# Patient Record
Sex: MALE | Race: BLACK OR AFRICAN AMERICAN | HISPANIC OR LATINO | URBAN - METROPOLITAN AREA
[De-identification: names, ages, dates, MRNs, and addresses within clinical notes are randomized per-mention and may not be internally consistent; named-entity substitution may affect disease eponyms.]

---

## 2019-07-26 ENCOUNTER — INPATIENT (INPATIENT)
Facility: HOSPITAL | Age: 19
LOS: 1 days | Discharge: HOME | DRG: 922 | End: 2019-07-28
Attending: SURGERY | Admitting: SURGERY
Payer: COMMERCIAL

## 2019-07-26 DIAGNOSIS — T75.1XXA UNSPECIFIED EFFECTS OF DROWNING AND NONFATAL SUBMERSION, INITIAL ENCOUNTER: ICD-10-CM

## 2019-07-26 LAB
ABO RH CONFIRMATION: SIGNIFICANT CHANGE UP
ALBUMIN SERPL ELPH-MCNC: 4.6 G/DL — SIGNIFICANT CHANGE UP (ref 3.3–5.2)
ALP SERPL-CCNC: 76 U/L — SIGNIFICANT CHANGE UP (ref 60–270)
ALT FLD-CCNC: 43 U/L — HIGH
AMPHET UR-MCNC: NEGATIVE — SIGNIFICANT CHANGE UP
ANION GAP SERPL CALC-SCNC: 12 MMOL/L — SIGNIFICANT CHANGE UP (ref 5–17)
ANION GAP SERPL CALC-SCNC: 21 MMOL/L — HIGH (ref 5–17)
APPEARANCE UR: CLEAR — SIGNIFICANT CHANGE UP
APTT BLD: 29.4 SEC — SIGNIFICANT CHANGE UP (ref 27.5–36.3)
AST SERPL-CCNC: 35 U/L — SIGNIFICANT CHANGE UP
BACTERIA # UR AUTO: ABNORMAL
BARBITURATES UR SCN-MCNC: NEGATIVE — SIGNIFICANT CHANGE UP
BASOPHILS # BLD AUTO: 0 K/UL — SIGNIFICANT CHANGE UP (ref 0–0.2)
BASOPHILS NFR BLD AUTO: 0 % — SIGNIFICANT CHANGE UP (ref 0–2)
BENZODIAZ UR-MCNC: NEGATIVE — SIGNIFICANT CHANGE UP
BILIRUB SERPL-MCNC: 0.3 MG/DL — LOW (ref 0.4–2)
BILIRUB UR-MCNC: NEGATIVE — SIGNIFICANT CHANGE UP
BLD GP AB SCN SERPL QL: SIGNIFICANT CHANGE UP
BLOOD GAS COMMENTS ARTERIAL: SIGNIFICANT CHANGE UP
BUN SERPL-MCNC: 10 MG/DL — SIGNIFICANT CHANGE UP (ref 8–20)
BUN SERPL-MCNC: 11 MG/DL — SIGNIFICANT CHANGE UP (ref 8–20)
CALCIUM SERPL-MCNC: 9 MG/DL — SIGNIFICANT CHANGE UP (ref 8.6–10.2)
CALCIUM SERPL-MCNC: 9.2 MG/DL — SIGNIFICANT CHANGE UP (ref 8.6–10.2)
CHLORIDE SERPL-SCNC: 104 MMOL/L — SIGNIFICANT CHANGE UP (ref 98–107)
CHLORIDE SERPL-SCNC: 105 MMOL/L — SIGNIFICANT CHANGE UP (ref 98–107)
CK MB CFR SERPL CALC: 1.5 NG/ML — SIGNIFICANT CHANGE UP (ref 0–6.7)
CK SERPL-CCNC: 218 U/L — HIGH (ref 30–200)
CO2 SERPL-SCNC: 18 MMOL/L — LOW (ref 22–29)
CO2 SERPL-SCNC: 24 MMOL/L — SIGNIFICANT CHANGE UP (ref 22–29)
COCAINE METAB.OTHER UR-MCNC: NEGATIVE — SIGNIFICANT CHANGE UP
COLOR SPEC: YELLOW — SIGNIFICANT CHANGE UP
CREAT SERPL-MCNC: 0.97 MG/DL — SIGNIFICANT CHANGE UP (ref 0.5–1.3)
CREAT SERPL-MCNC: 1.42 MG/DL — HIGH (ref 0.5–1.3)
DIFF PNL FLD: NEGATIVE — SIGNIFICANT CHANGE UP
EOSINOPHIL # BLD AUTO: 0.21 K/UL — SIGNIFICANT CHANGE UP (ref 0–0.5)
EOSINOPHIL NFR BLD AUTO: 1.8 % — SIGNIFICANT CHANGE UP (ref 0–6)
EPI CELLS # UR: SIGNIFICANT CHANGE UP
ETHANOL SERPL-MCNC: <10 MG/DL — SIGNIFICANT CHANGE UP
GAS PNL BLDA: SIGNIFICANT CHANGE UP
GIANT PLATELETS BLD QL SMEAR: PRESENT — SIGNIFICANT CHANGE UP
GLUCOSE SERPL-MCNC: 122 MG/DL — HIGH (ref 70–115)
GLUCOSE SERPL-MCNC: 142 MG/DL — HIGH (ref 70–115)
GLUCOSE UR QL: NEGATIVE MG/DL — SIGNIFICANT CHANGE UP
HCO3 BLDA-SCNC: 24 MMOL/L — SIGNIFICANT CHANGE UP (ref 20–26)
HCT VFR BLD CALC: 40.4 % — SIGNIFICANT CHANGE UP (ref 39–50)
HGB BLD-MCNC: 12.6 G/DL — LOW (ref 13–17)
HOROWITZ INDEX BLDA+IHG-RTO: SIGNIFICANT CHANGE UP
INR BLD: 1.06 RATIO — SIGNIFICANT CHANGE UP (ref 0.88–1.16)
KETONES UR-MCNC: NEGATIVE — SIGNIFICANT CHANGE UP
LEUKOCYTE ESTERASE UR-ACNC: NEGATIVE — SIGNIFICANT CHANGE UP
LIDOCAIN IGE QN: 21 U/L — LOW (ref 22–51)
LYMPHOCYTES # BLD AUTO: 5.88 K/UL — HIGH (ref 1–3.3)
LYMPHOCYTES # BLD AUTO: 50.4 % — HIGH (ref 13–44)
MAGNESIUM SERPL-MCNC: 2.1 MG/DL — SIGNIFICANT CHANGE UP (ref 1.6–2.6)
MANUAL SMEAR VERIFICATION: SIGNIFICANT CHANGE UP
MCHC RBC-ENTMCNC: 27 PG — SIGNIFICANT CHANGE UP (ref 27–34)
MCHC RBC-ENTMCNC: 31.2 GM/DL — LOW (ref 32–36)
MCV RBC AUTO: 86.5 FL — SIGNIFICANT CHANGE UP (ref 80–100)
METHADONE UR-MCNC: NEGATIVE — SIGNIFICANT CHANGE UP
MONOCYTES # BLD AUTO: 0.82 K/UL — SIGNIFICANT CHANGE UP (ref 0–0.9)
MONOCYTES NFR BLD AUTO: 7 % — SIGNIFICANT CHANGE UP (ref 2–14)
NEUTROPHILS # BLD AUTO: 4.56 K/UL — SIGNIFICANT CHANGE UP (ref 1.8–7.4)
NEUTROPHILS NFR BLD AUTO: 39.1 % — LOW (ref 43–77)
NITRITE UR-MCNC: NEGATIVE — SIGNIFICANT CHANGE UP
OPIATES UR-MCNC: NEGATIVE — SIGNIFICANT CHANGE UP
PCO2 BLDA: 40 MMHG — SIGNIFICANT CHANGE UP (ref 35–45)
PCP SPEC-MCNC: SIGNIFICANT CHANGE UP
PCP UR-MCNC: NEGATIVE — SIGNIFICANT CHANGE UP
PH BLDA: 7.4 — SIGNIFICANT CHANGE UP (ref 7.35–7.45)
PH UR: 6 — SIGNIFICANT CHANGE UP (ref 5–8)
PHOSPHATE SERPL-MCNC: 3.6 MG/DL — SIGNIFICANT CHANGE UP (ref 2.4–4.7)
PLAT MORPH BLD: NORMAL — SIGNIFICANT CHANGE UP
PLATELET # BLD AUTO: 248 K/UL — SIGNIFICANT CHANGE UP (ref 150–400)
PO2 BLDA: 69 MMHG — LOW (ref 83–108)
POTASSIUM SERPL-MCNC: 3.4 MMOL/L — LOW (ref 3.5–5.3)
POTASSIUM SERPL-MCNC: 4.2 MMOL/L — SIGNIFICANT CHANGE UP (ref 3.5–5.3)
POTASSIUM SERPL-SCNC: 3.4 MMOL/L — LOW (ref 3.5–5.3)
POTASSIUM SERPL-SCNC: 4.2 MMOL/L — SIGNIFICANT CHANGE UP (ref 3.5–5.3)
PROT SERPL-MCNC: 7.5 G/DL — SIGNIFICANT CHANGE UP (ref 6.6–8.7)
PROT UR-MCNC: 15 MG/DL
PROTHROM AB SERPL-ACNC: 12.2 SEC — SIGNIFICANT CHANGE UP (ref 10–12.9)
RBC # BLD: 4.67 M/UL — SIGNIFICANT CHANGE UP (ref 4.2–5.8)
RBC # FLD: 12 % — SIGNIFICANT CHANGE UP (ref 10.3–14.5)
RBC BLD AUTO: SIGNIFICANT CHANGE UP
RBC CASTS # UR COMP ASSIST: SIGNIFICANT CHANGE UP /HPF (ref 0–4)
SAO2 % BLDA: 95 % — SIGNIFICANT CHANGE UP (ref 95–99)
SODIUM SERPL-SCNC: 141 MMOL/L — SIGNIFICANT CHANGE UP (ref 135–145)
SODIUM SERPL-SCNC: 143 MMOL/L — SIGNIFICANT CHANGE UP (ref 135–145)
SP GR SPEC: 1.02 — SIGNIFICANT CHANGE UP (ref 1.01–1.02)
THC UR QL: NEGATIVE — SIGNIFICANT CHANGE UP
TROPONIN T SERPL-MCNC: <0.01 NG/ML — SIGNIFICANT CHANGE UP (ref 0–0.06)
UROBILINOGEN FLD QL: NEGATIVE MG/DL — SIGNIFICANT CHANGE UP
VARIANT LYMPHS # BLD: 1.7 % — SIGNIFICANT CHANGE UP (ref 0–6)
WBC # BLD: 11.67 K/UL — HIGH (ref 3.8–10.5)
WBC # FLD AUTO: 11.67 K/UL — HIGH (ref 3.8–10.5)
WBC UR QL: SIGNIFICANT CHANGE UP

## 2019-07-26 PROCEDURE — 99223 1ST HOSP IP/OBS HIGH 75: CPT

## 2019-07-26 PROCEDURE — 99291 CRITICAL CARE FIRST HOUR: CPT

## 2019-07-26 PROCEDURE — 93010 ELECTROCARDIOGRAM REPORT: CPT

## 2019-07-26 PROCEDURE — 71045 X-RAY EXAM CHEST 1 VIEW: CPT | Mod: 26

## 2019-07-26 PROCEDURE — 71260 CT THORAX DX C+: CPT | Mod: 26

## 2019-07-26 PROCEDURE — 72125 CT NECK SPINE W/O DYE: CPT | Mod: 26

## 2019-07-26 PROCEDURE — 70450 CT HEAD/BRAIN W/O DYE: CPT | Mod: 26

## 2019-07-26 RX ORDER — CEFTRIAXONE 500 MG/1
2000 INJECTION, POWDER, FOR SOLUTION INTRAMUSCULAR; INTRAVENOUS ONCE
Refills: 0 | Status: COMPLETED | OUTPATIENT
Start: 2019-07-26 | End: 2019-07-26

## 2019-07-26 RX ORDER — DOCUSATE SODIUM 100 MG
100 CAPSULE ORAL EVERY 8 HOURS
Refills: 0 | Status: DISCONTINUED | OUTPATIENT
Start: 2019-07-26 | End: 2019-07-26

## 2019-07-26 RX ORDER — ENOXAPARIN SODIUM 100 MG/ML
40 INJECTION SUBCUTANEOUS DAILY
Refills: 0 | Status: DISCONTINUED | OUTPATIENT
Start: 2019-07-26 | End: 2019-07-28

## 2019-07-26 RX ORDER — ACETAMINOPHEN 500 MG
650 TABLET ORAL EVERY 6 HOURS
Refills: 0 | Status: DISCONTINUED | OUTPATIENT
Start: 2019-07-26 | End: 2019-07-28

## 2019-07-26 RX ORDER — LIDOCAINE 4 G/100G
1 CREAM TOPICAL DAILY
Refills: 0 | Status: DISCONTINUED | OUTPATIENT
Start: 2019-07-26 | End: 2019-07-28

## 2019-07-26 RX ORDER — SODIUM CHLORIDE 9 MG/ML
1000 INJECTION, SOLUTION INTRAVENOUS
Refills: 0 | Status: DISCONTINUED | OUTPATIENT
Start: 2019-07-26 | End: 2019-07-26

## 2019-07-26 RX ORDER — ONDANSETRON 8 MG/1
4 TABLET, FILM COATED ORAL EVERY 6 HOURS
Refills: 0 | Status: DISCONTINUED | OUTPATIENT
Start: 2019-07-26 | End: 2019-07-27

## 2019-07-26 RX ORDER — CHLORHEXIDINE GLUCONATE 213 G/1000ML
1 SOLUTION TOPICAL
Refills: 0 | Status: DISCONTINUED | OUTPATIENT
Start: 2019-07-26 | End: 2019-07-27

## 2019-07-26 RX ORDER — ACETAMINOPHEN 500 MG
1000 TABLET ORAL ONCE
Refills: 0 | Status: COMPLETED | OUTPATIENT
Start: 2019-07-26 | End: 2019-07-26

## 2019-07-26 RX ORDER — IPRATROPIUM/ALBUTEROL SULFATE 18-103MCG
3 AEROSOL WITH ADAPTER (GRAM) INHALATION EVERY 6 HOURS
Refills: 0 | Status: COMPLETED | OUTPATIENT
Start: 2019-07-26 | End: 2019-07-27

## 2019-07-26 RX ORDER — ONDANSETRON 8 MG/1
4 TABLET, FILM COATED ORAL ONCE
Refills: 0 | Status: COMPLETED | OUTPATIENT
Start: 2019-07-26 | End: 2019-07-26

## 2019-07-26 RX ORDER — SENNA PLUS 8.6 MG/1
2 TABLET ORAL AT BEDTIME
Refills: 0 | Status: DISCONTINUED | OUTPATIENT
Start: 2019-07-26 | End: 2019-07-26

## 2019-07-26 RX ORDER — ONDANSETRON 8 MG/1
4 TABLET, FILM COATED ORAL EVERY 6 HOURS
Refills: 0 | Status: DISCONTINUED | OUTPATIENT
Start: 2019-07-26 | End: 2019-07-26

## 2019-07-26 RX ADMIN — LIDOCAINE 1 PATCH: 4 CREAM TOPICAL at 11:11

## 2019-07-26 RX ADMIN — LIDOCAINE 1 PATCH: 4 CREAM TOPICAL at 23:00

## 2019-07-26 RX ADMIN — ENOXAPARIN SODIUM 40 MILLIGRAM(S): 100 INJECTION SUBCUTANEOUS at 11:11

## 2019-07-26 RX ADMIN — Medication 100 MILLIGRAM(S): at 21:27

## 2019-07-26 RX ADMIN — Medication 100 MILLIGRAM(S): at 13:19

## 2019-07-26 RX ADMIN — Medication 3 MILLILITER(S): at 08:48

## 2019-07-26 RX ADMIN — Medication 400 MILLIGRAM(S): at 03:30

## 2019-07-26 RX ADMIN — CHLORHEXIDINE GLUCONATE 1 APPLICATION(S): 213 SOLUTION TOPICAL at 05:06

## 2019-07-26 RX ADMIN — ONDANSETRON 4 MILLIGRAM(S): 8 TABLET, FILM COATED ORAL at 05:06

## 2019-07-26 RX ADMIN — SODIUM CHLORIDE 125 MILLILITER(S): 9 INJECTION, SOLUTION INTRAVENOUS at 03:44

## 2019-07-26 RX ADMIN — Medication 650 MILLIGRAM(S): at 15:00

## 2019-07-26 RX ADMIN — LIDOCAINE 1 PATCH: 4 CREAM TOPICAL at 19:12

## 2019-07-26 RX ADMIN — Medication 1000 MILLIGRAM(S): at 03:45

## 2019-07-26 RX ADMIN — Medication 3 MILLILITER(S): at 20:00

## 2019-07-26 RX ADMIN — CEFTRIAXONE 50 MILLIGRAM(S): 500 INJECTION, POWDER, FOR SOLUTION INTRAMUSCULAR; INTRAVENOUS at 00:45

## 2019-07-26 RX ADMIN — ONDANSETRON 4 MILLIGRAM(S): 8 TABLET, FILM COATED ORAL at 00:45

## 2019-07-26 RX ADMIN — Medication 3 MILLILITER(S): at 14:44

## 2019-07-26 RX ADMIN — Medication 650 MILLIGRAM(S): at 14:45
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
## 2019-07-26 NOTE — ED PROVIDER NOTE - RESPIRATORY, MLM
No respiratory distress. No stridor, Lungs sounds clear with good aeration bilaterally. Airway intact

## 2019-07-26 NOTE — ED PROVIDER NOTE - NORMAL STATEMENT, MLM
Airway patent, TM normal bilaterally, normal appearing mouth, nose, throat, neck supple with full range of motion, no cervical adenopathy. Blood in oral cavity.

## 2019-07-26 NOTE — ED CLERICAL - NS ED CLERK UNITS
[id number] (OVERFLOW[facility]

## 2019-07-26 NOTE — ED PROVIDER NOTE - CARDIAC
Regular rate and rhythm, Heart sounds S1 S2 present, no murmurs, rubs or gallops tachy and rhythm, Heart sounds S1 S2 present

## 2019-07-26 NOTE — H&P ADULT - NSHPPHYSICALEXAM_GEN_ALL_CORE
Constitutional: Well-developed well nourished Male, no acute distress    Neurological: GCS: 15 (4/5/6). A&O x 3; no gross sensory / motor / coordination deficits    HEENT: Head is normocephalic and atraumatic, mandibular alignment intact, midface stable, no blood or discharge from nares or oral cavity, no shrestha sign / racoon eyes, EOMI b/l, pupils 3 mm round and reactive to light b/l, no active drainage or redness    Neck: cervical collar in place, trachea midline    Respiratory: Breath sounds CTA b/l, mild increase work of breathing.     Cardiovascular: Regular rate & rhythm, +S1, S1, Chest wall is non-tender to palpation, no subQ emphysema or crepitus palpated    Gastrointestinal: Abdomen soft, non-tender, non-distended, no rebound tenderness / guarding, no ecchymosis or external signs of abdominal trauma    Pelvis: stable    Neurospinal: C/T/L/S spines have no tenderness to palpation, no step-offs or signs of external trauma.    Musculoskeletal: moving all extremities spontaneously. No point tenderness, instability, or gross msk deformity noted to upper or lower extremities bilaterally.  5/5 strength of upper and lower extremities bilaterally.     Vascular: 2+ radial, femoral, and DP pulses b/l    Skin: no significant lacerations.
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
## 2019-07-26 NOTE — PROGRESS NOTE ADULT - SUBJECTIVE AND OBJECTIVE BOX
Patient seen and examined at bedside by ACS/Floor team. Was OOB to chair receiving treatments by respiratory team. On high flow NC.     Vital Signs Last 24 Hrs  T(C): 36.3 (26 Jul 2019 12:00), Max: 37.4 (26 Jul 2019 01:48)  T(F): 97.4 (26 Jul 2019 12:00), Max: 99.3 (26 Jul 2019 01:48)  HR: 114 (26 Jul 2019 11:00) (90 - 135)  BP: 136/83 (26 Jul 2019 11:00) (136/83 - 174/93)  BP(mean): 104 (26 Jul 2019 11:00) (99 - 125)  RR: 31 (26 Jul 2019 09:00) (17 - 36)  SpO2: 100% (26 Jul 2019 11:00) (92% - 100%)    I&O's Detail    25 Jul 2019 07:01  -  26 Jul 2019 07:00  --------------------------------------------------------  IN:    multiple electrolytes Injection Type 1multiple electrolytes Injection Type 1: 500 mL    Solution: 100 mL  Total IN: 600 mL    OUT:    Voided: 600 mL  Total OUT: 600 mL    Total NET: 0 mL    Constitutional: Patient resting comfortably in bed in no acute distress   HEENT: EOMI/PERRL b/l  Neck: No JVD  Respiratory: Respiratory breathing treatment present   Cardiovascular: Regular rate and rhythm with no arrythmias or murmurs  Gastrointestinal: Abdomen soft, non-tender, non-distended with no rebound tenderness or guarding   Rectal: Not indicated   Neurological: GCS 15 ([id number]) with no gross sensory, motor or coordination deficits   Psychiatric: Normal mood and affect   Musculoskeletal: No joint pain, swelling or deformity with no limitation of movement     26 Jul 2019 07:01  -  26 Jul 2019 12:13  --------------------------------------------------------  IN:    multiple electrolytes Injection Type 1multiple electrolytes Injection Type 1: 125 mL  Total IN: 125 mL    OUT:  Total OUT: 0 mL    Total NET: 125 mL        LABS:                        12.6   11.67 )-----------( 248      ( 26 Jul 2019 00:19 )             40.4     07-26    141  |  105  |  10.0  ----------------------------<  122<H>  4.2   |  24.0  |  0.97    Ca    9.0      26 Jul 2019 08:04  Phos  3.6     07-26  Mg     2.1     07-26    TPro  7.5  /  Alb  4.6  /  TBili  0.3<L>  /  DBili  x   /  AST  35  /  ALT  43<H>  /  AlkPhos  76  07-26    PT/INR - ( 26 Jul 2019 00:19 )   PT: 12.2 sec;   INR: 1.06 ratio         PTT - ( 26 Jul 2019 00:19 )  PTT:29.4 sec      MEDICATIONS  (STANDING):  ALBUTerol/ipratropium for Nebulization 3 milliLiter(s) Nebulizer every 6 hours  chlorhexidine 2% Cloths 1 Application(s) Topical <User Schedule>  docusate sodium 100 milliGRAM(s) Oral every 8 hours  enoxaparin Injectable 40 milliGRAM(s) SubCutaneous daily  lidocaine   Patch 1 Patch Transdermal daily  ondansetron Injectable 4 milliGRAM(s) IV Push every 6 hours  senna 2 Tablet(s) Oral at bedtime    MEDICATIONS  (PRN):

## 2019-07-27 ENCOUNTER — TRANSCRIPTION ENCOUNTER (OUTPATIENT)
Age: 19
End: 2019-07-27

## 2019-07-27 LAB
ANION GAP SERPL CALC-SCNC: 12 MMOL/L — SIGNIFICANT CHANGE UP (ref 5–17)
BASE EXCESS BLDA CALC-SCNC: 1.6 MMOL/L — SIGNIFICANT CHANGE UP (ref -3–3)
BASOPHILS # BLD AUTO: 0.02 K/UL — SIGNIFICANT CHANGE UP (ref 0–0.2)
BASOPHILS NFR BLD AUTO: 0.2 % — SIGNIFICANT CHANGE UP (ref 0–2)
BLOOD GAS COMMENTS ARTERIAL: SIGNIFICANT CHANGE UP
BUN SERPL-MCNC: 9 MG/DL — SIGNIFICANT CHANGE UP (ref 8–20)
CALCIUM SERPL-MCNC: 9.1 MG/DL — SIGNIFICANT CHANGE UP (ref 8.6–10.2)
CHLORIDE SERPL-SCNC: 104 MMOL/L — SIGNIFICANT CHANGE UP (ref 98–107)
CO2 SERPL-SCNC: 25 MMOL/L — SIGNIFICANT CHANGE UP (ref 22–29)
CREAT SERPL-MCNC: 0.97 MG/DL — SIGNIFICANT CHANGE UP (ref 0.5–1.3)
EOSINOPHIL # BLD AUTO: 0.2 K/UL — SIGNIFICANT CHANGE UP (ref 0–0.5)
EOSINOPHIL NFR BLD AUTO: 2 % — SIGNIFICANT CHANGE UP (ref 0–6)
GAS PNL BLDA: SIGNIFICANT CHANGE UP
GLUCOSE SERPL-MCNC: 103 MG/DL — SIGNIFICANT CHANGE UP (ref 70–115)
HCO3 BLDA-SCNC: 26 MMOL/L — SIGNIFICANT CHANGE UP (ref 20–26)
HCT VFR BLD CALC: 38 % — LOW (ref 39–50)
HGB BLD-MCNC: 11.9 G/DL — LOW (ref 13–17)
HOROWITZ INDEX BLDA+IHG-RTO: 30 — SIGNIFICANT CHANGE UP
IMM GRANULOCYTES NFR BLD AUTO: 0.2 % — SIGNIFICANT CHANGE UP (ref 0–1.5)
LYMPHOCYTES # BLD AUTO: 2.48 K/UL — SIGNIFICANT CHANGE UP (ref 1–3.3)
LYMPHOCYTES # BLD AUTO: 25.3 % — SIGNIFICANT CHANGE UP (ref 13–44)
MAGNESIUM SERPL-MCNC: 2.3 MG/DL — SIGNIFICANT CHANGE UP (ref 1.6–2.6)
MCHC RBC-ENTMCNC: 27.1 PG — SIGNIFICANT CHANGE UP (ref 27–34)
MCHC RBC-ENTMCNC: 31.3 GM/DL — LOW (ref 32–36)
MCV RBC AUTO: 86.6 FL — SIGNIFICANT CHANGE UP (ref 80–100)
MONOCYTES # BLD AUTO: 1.17 K/UL — HIGH (ref 0–0.9)
MONOCYTES NFR BLD AUTO: 11.9 % — SIGNIFICANT CHANGE UP (ref 2–14)
NEUTROPHILS # BLD AUTO: 5.93 K/UL — SIGNIFICANT CHANGE UP (ref 1.8–7.4)
NEUTROPHILS NFR BLD AUTO: 60.4 % — SIGNIFICANT CHANGE UP (ref 43–77)
PCO2 BLDA: 44 MMHG — SIGNIFICANT CHANGE UP (ref 35–45)
PH BLDA: 7.4 — SIGNIFICANT CHANGE UP (ref 7.35–7.45)
PHOSPHATE SERPL-MCNC: 2.9 MG/DL — SIGNIFICANT CHANGE UP (ref 2.4–4.7)
PLATELET # BLD AUTO: 198 K/UL — SIGNIFICANT CHANGE UP (ref 150–400)
PO2 BLDA: 71 MMHG — LOW (ref 83–108)
POTASSIUM SERPL-MCNC: 3.5 MMOL/L — SIGNIFICANT CHANGE UP (ref 3.5–5.3)
POTASSIUM SERPL-SCNC: 3.5 MMOL/L — SIGNIFICANT CHANGE UP (ref 3.5–5.3)
RBC # BLD: 4.39 M/UL — SIGNIFICANT CHANGE UP (ref 4.2–5.8)
RBC # FLD: 12.3 % — SIGNIFICANT CHANGE UP (ref 10.3–14.5)
SAO2 % BLDA: 94 % — LOW (ref 95–99)
SODIUM SERPL-SCNC: 141 MMOL/L — SIGNIFICANT CHANGE UP (ref 135–145)
WBC # BLD: 9.82 K/UL — SIGNIFICANT CHANGE UP (ref 3.8–10.5)
WBC # FLD AUTO: 9.82 K/UL — SIGNIFICANT CHANGE UP (ref 3.8–10.5)

## 2019-07-27 PROCEDURE — 99232 SBSQ HOSP IP/OBS MODERATE 35: CPT

## 2019-07-27 RX ORDER — POTASSIUM CHLORIDE 20 MEQ
40 PACKET (EA) ORAL EVERY 4 HOURS
Refills: 0 | Status: COMPLETED | OUTPATIENT
Start: 2019-07-27 | End: 2019-07-27

## 2019-07-27 RX ADMIN — Medication 40 MILLIEQUIVALENT(S): at 07:45

## 2019-07-27 RX ADMIN — CHLORHEXIDINE GLUCONATE 1 APPLICATION(S): 213 SOLUTION TOPICAL at 09:21

## 2019-07-27 RX ADMIN — LIDOCAINE 1 PATCH: 4 CREAM TOPICAL at 21:55

## 2019-07-27 RX ADMIN — LIDOCAINE 1 PATCH: 4 CREAM TOPICAL at 20:35

## 2019-07-27 RX ADMIN — Medication 40 MILLIEQUIVALENT(S): at 03:59

## 2019-07-27 RX ADMIN — LIDOCAINE 1 PATCH: 4 CREAM TOPICAL at 10:12

## 2019-07-27 RX ADMIN — ENOXAPARIN SODIUM 40 MILLIGRAM(S): 100 INJECTION SUBCUTANEOUS at 10:12

## 2019-07-27 RX ADMIN — Medication 650 MILLIGRAM(S): at 10:12

## 2019-07-27 RX ADMIN — Medication 650 MILLIGRAM(S): at 11:20

## 2019-07-27 NOTE — DISCHARGE NOTE PROVIDER - CARE PROVIDER_API CALL
Douglas Fountain ()  Surgical ICU  [address] [place] [address]  Phone: [phone]  Fax: [phone]  Follow Up Time: [facility],   [facility] [address] [place] [address]  Phone: [phone]  Fax: (   )    -  Follow Up Time:

## 2019-07-27 NOTE — PROGRESS NOTE ADULT - SUBJECTIVE AND OBJECTIVE BOX
SICU downgrade    HPI/OVERNIGHT EVENTS:    Pt rest comfortable on the chair watching tv. Patient examined at bed side. No pain no complains, no acute events. He still refers light pain on the chest from the chest compressions during CPR    MEDICATIONS  (STANDING):  enoxaparin Injectable 40 milliGRAM(s) SubCutaneous daily  lidocaine   Patch 1 Patch Transdermal daily    MEDICATIONS  (PRN):  acetaminophen   Tablet .. 650 milliGRAM(s) Oral every 6 hours PRN Mild Pain (1 - 3)      Vital Signs Last 24 Hrs  T(C): 36.6 (2019 15:57), Max: 37 (2019 20:00)  T(F): 97.8 (2019 15:57), Max: 98.6 (2019 20:00)  HR: 86 (2019 15:57) (74 - 107)  BP: 149/94 (2019 15:57) (129/63 - 177/83)  BP(mean): 100 (2019 13:19) (85 - 134)  RR: 20 (2019 15:57) (15 - 33)  SpO2: 98% (2019 15:57) (96% - 100%)    Constitutional: patient resting comfortably in bed, in no acute distress  HEENT: EOMI / PERRL b/l, no active drainage or redness  Neck: No JVD, full ROM without pain  Respiratory: respirations are unlabored, no accessory muscle use, no conversational dyspnea  Cardiovascular: regular rate & rhythm  Gastrointestinal: Abdomen soft, non-tender, non-distended, no rebound tenderness / guarding  Neurological: GCS: 15 (4/5/6). A&O x 3; no gross sensory / motor / coordination deficits  Psychiatric: Normal mood, normal affect  Musculoskeletal: No joint pain, swelling or deformity; no limitation of movement      I&O's Detail    2019 07:01  -  2019 07:00  --------------------------------------------------------  IN:    multiple electrolytes Injection Type 1multiple electrolytes Injection Type 1: 125 mL    Oral Fluid: 360 mL  Total IN: 485 mL    OUT:    Voided: 1350 mL  Total OUT: 1350 mL    Total NET: -865 mL      2019 07:01  -  2019 16:16  --------------------------------------------------------  IN:    Oral Fluid: 540 mL  Total IN: 540 mL    OUT:    Voided: 225 mL  Total OUT: 225 mL    Total NET: 315 mL          LABS:                        11.9   9.82  )-----------( 198      ( 2019 02:53 )             38.0     07-    141  |  104  |  9.0  ----------------------------<  103  3.5   |  25.0  |  0.97    Ca    9.1      2019 02:53  Phos  2.9       Mg     2.3         TPro  7.5  /  Alb  4.6  /  TBili  0.3<L>  /  DBili  x   /  AST  35  /  ALT  43<H>  /  AlkPhos  76  07-26    PT/INR - ( 2019 00:19 )   PT: 12.2 sec;   INR: 1.06 ratio         PTT - ( 2019 00:19 )  PTT:29.4 sec  Urinalysis Basic - ( 2019 16:32 )    Color: Yellow / Appearance: Clear / S.020 / pH: x  Gluc: x / Ketone: Negative  / Bili: Negative / Urobili: Negative mg/dL   Blood: x / Protein: 15 mg/dL / Nitrite: Negative   Leuk Esterase: Negative / RBC: 0-5 /HPF / WBC 3-5   Sq Epi: x / Non Sq Epi: Few / Bacteria: Few

## 2019-07-27 NOTE — DISCHARGE NOTE PROVIDER - NSDCACTIVITY_GEN_ALL_CORE
No restrictions/Return to Work/School allowed/Bathing allowed/Sex allowed/Showering allowed/Stairs allowed/Driving allowed/Walking - Indoors allowed/Walking - Outdoors allowed

## 2019-07-27 NOTE — DISCHARGE NOTE PROVIDER - PROVIDER TOKENS
PROVIDER:[TOKEN:[[id number]]] FREE:[LAST:[[facility]],PHONE:[phone]],FAX:[(   )    -],ADDRESS:[[address] [place] [address]]]

## 2019-07-27 NOTE — PROGRESS NOTE ADULT - SUBJECTIVE AND OBJECTIVE BOX
INTERVAL HPI/OVERNIGHT EVENTS:    Pt with clinical improvement.  Maintaining O2 sats on high flow NC on 30-40% FiO2.  Pt denies shortness of breath at this time.  OOB to chair most of day and evening shift.  Pt without focal neurologic deficit.  PF improving    MEDICATIONS  (STANDING):  ALBUTerol/ipratropium for Nebulization 3 milliLiter(s) Nebulizer every 6 hours  chlorhexidine 2% Cloths 1 Application(s) Topical <User Schedule>  enoxaparin Injectable 40 milliGRAM(s) SubCutaneous daily  lidocaine   Patch 1 Patch Transdermal daily  ondansetron Injectable 4 milliGRAM(s) IV Push every 6 hours  potassium chloride   Powder 40 milliEquivalent(s) Oral every 4 hours    MEDICATIONS  (PRN):  acetaminophen   Tablet .. 650 milliGRAM(s) Oral every 6 hours PRN Mild Pain (1 - 3)      Drug Dosing Weight  Height (cm): 185.4 (2019 03:00)  Weight (kg): 133.4 (2019 03:00)  BMI (kg/m2): 38.8 (2019 03:00)  BSA (m2): 2.53 (2019 03:00)        PAST MEDICAL & SURGICAL HISTORY:  No pertinent past medical history  No significant past surgical history      ICU Vital Signs Last 24 Hrs  T(C): 36.9 (2019 01:00), Max: 37 (2019 20:00)  T(F): 98.4 (2019 01:00), Max: 98.6 (2019 20:00)  HR: 100 (2019 03:00) (82 - 118)  BP: 147/81 (2019 03:00) (136/83 - 177/83)  BP(mean): 109 (2019 03:00) (91 - 134)  ABP: --  ABP(mean): --  RR: 33 (2019 03:00) (16 - 36)  SpO2: 99% (2019 03:00) (95% - 100%)      ABG - ( 2019 14:03 )  pH, Arterial: 7.40  pH, Blood: x     /  pCO2: 40    /  pO2: 69    / HCO3: 24    / Base Excess: x     /  SaO2: 95                  I&O's Detail    2019 07:01  -  2019 07:00  --------------------------------------------------------  IN:    multiple electrolytes Injection Type 1multiple electrolytes Injection Type 1: 500 mL    Solution: 100 mL  Total IN: 600 mL    OUT:    Voided: 600 mL  Total OUT: 600 mL    Total NET: 0 mL      2019 07:01  -  2019 03:36  --------------------------------------------------------  IN:    multiple electrolytes Injection Type 1multiple electrolytes Injection Type 1: 125 mL    Oral Fluid: 360 mL  Total IN: 485 mL    OUT:    Voided: 450 mL  Total OUT: 450 mL    Total NET: 35 mL              Physical Exam:    Neurological:  No sensory/motor deficits    HEENT: Bilateral subconjunctival hemorrhages.  PERRLA, EOMI, no drainage     Neck: No bruits; no thyromegaly or nodules,  No JVD    Back: Normal spine flexure, No CVA tenderness, No deformity or limitation of movement    Respiratory: Crackles to bilateral lower lung base.  Breathing non-labored.  No accessory muscle use    Cardiovascular: Regular rate & rhythm, normal S1, S2; no murmurs, gallops or rubs    Gastrointestinal: Soft, non-tender, normal bowel sounds    Extremities: No peripheral edema, No cyanosis, clubbing     Vascular: Equal and normal pulses: 2+ peripheral pulses throughout    Musculoskeletal: No joint pain, swelling or deformity; no limitation of movement    Skin: No rashes    LABS:  CBC Full  -  ( 2019 02:53 )  WBC Count : 9.82 K/uL  RBC Count : 4.39 M/uL  Hemoglobin : 11.9 g/dL  Hematocrit : 38.0 %  Platelet Count - Automated : 198 K/uL  Mean Cell Volume : 86.6 fl  Mean Cell Hemoglobin : 27.1 pg  Mean Cell Hemoglobin Concentration : 31.3 gm/dL  Auto Neutrophil # : 5.93 K/uL  Auto Lymphocyte # : 2.48 K/uL  Auto Monocyte # : 1.17 K/uL  Auto Eosinophil # : 0.20 K/uL  Auto Basophil # : 0.02 K/uL  Auto Neutrophil % : 60.4 %  Auto Lymphocyte % : 25.3 %  Auto Monocyte % : 11.9 %  Auto Eosinophil % : 2.0 %  Auto Basophil % : 0.2 %        141  |  104  |  9.0  ----------------------------<  103  3.5   |  25.0  |  0.97    Ca    9.1      2019 02:53  Phos  2.9       Mg     2.3         TPro  7.5  /  Alb  4.6  /  TBili  0.3<L>  /  DBili  x   /  AST  35  /  ALT  43<H>  /  AlkPhos  76      PT/INR - ( 2019 00:19 )   PT: 12.2 sec;   INR: 1.06 ratio         PTT - ( 2019 00:19 )  PTT:29.4 sec  Urinalysis Basic - ( 2019 16:32 )    Color: Yellow / Appearance: Clear / S.020 / pH: x  Gluc: x / Ketone: Negative  / Bili: Negative / Urobili: Negative mg/dL   Blood: x / Protein: 15 mg/dL / Nitrite: Negative   Leuk Esterase: Negative / RBC: 0-5 /HPF / WBC 3-5   Sq Epi: x / Non Sq Epi: Few / Bacteria: Few INTERVAL HPI/OVERNIGHT EVENTS:    Pt with clinical improvement.  Maintaining O2 sats on high flow NC on 30-40% FiO2.  Pt denies shortness of breath at this time.  OOB to chair most of day and evening shift.  Pt without focal neurologic deficit.  PF improving    MEDICATIONS  (STANDING):  ALBUTerol/ipratropium for Nebulization 3 milliLiter(s) Nebulizer every 6 hours  chlorhexidine 2% Cloths 1 Application(s) Topical <User Schedule>  enoxaparin Injectable 40 milliGRAM(s) SubCutaneous daily  lidocaine   Patch 1 Patch Transdermal daily  ondansetron Injectable 4 milliGRAM(s) IV Push every 6 hours  potassium chloride   Powder 40 milliEquivalent(s) Oral every 4 hours    MEDICATIONS  (PRN):  acetaminophen   Tablet .. 650 milliGRAM(s) Oral every 6 hours PRN Mild Pain (1 - 3)      Drug Dosing Weight  Height (cm): 185.4 (2019 03:00)  Weight (kg): 133.4 (2019 03:00)  BMI (kg/m2): 38.8 (2019 03:00)  BSA (m2): 2.53 (2019 03:00)        PAST MEDICAL & SURGICAL HISTORY:  No pertinent past medical history  No significant past surgical history      ICU Vital Signs Last 24 Hrs  T(C): 36.9 (2019 01:00), Max: 37 (2019 20:00)  T(F): 98.4 (2019 01:00), Max: 98.6 (2019 20:00)  HR: 100 (2019 03:00) (82 - 118)  BP: 147/81 (2019 03:00) (136/83 - 177/83)  BP(mean): 109 (2019 03:00) (91 - 134)  ABP: --  ABP(mean): --  RR: 33 (2019 03:00) (16 - 36)  SpO2: 99% (2019 03:00) (95% - 100%)      ABG - ( 2019 06:11 )  pH, Arterial: 7.40  pH, Blood: x     /  pCO2: 44    /  pO2: 71    / HCO3: 26    / Base Excess: 1.6   /  SaO2: 94                              I&O's Detail    2019 07:01  -  2019 07:00  --------------------------------------------------------  IN:    multiple electrolytes Injection Type 1multiple electrolytes Injection Type 1: 500 mL    Solution: 100 mL  Total IN: 600 mL    OUT:    Voided: 600 mL  Total OUT: 600 mL    Total NET: 0 mL      2019 07:01  -  2019 03:36  --------------------------------------------------------  IN:    multiple electrolytes Injection Type 1multiple electrolytes Injection Type 1: 125 mL    Oral Fluid: 360 mL  Total IN: 485 mL    OUT:    Voided: 450 mL  Total OUT: 450 mL    Total NET: 35 mL              Physical Exam:    Neurological:  No sensory/motor deficits    HEENT: Bilateral subconjunctival hemorrhages.  PERRLA, EOMI, no drainage     Neck: No bruits; no thyromegaly or nodules,  No JVD    Back: Normal spine flexure, No CVA tenderness, No deformity or limitation of movement    Respiratory: Crackles to bilateral lower lung base.  Breathing non-labored.  No accessory muscle use    Cardiovascular: Regular rate & rhythm, normal S1, S2; no murmurs, gallops or rubs    Gastrointestinal: Soft, non-tender, normal bowel sounds    Extremities: No peripheral edema, No cyanosis, clubbing     Vascular: Equal and normal pulses: 2+ peripheral pulses throughout    Musculoskeletal: No joint pain, swelling or deformity; no limitation of movement    Skin: No rashes    LABS:  CBC Full  -  ( 2019 02:53 )  WBC Count : 9.82 K/uL  RBC Count : 4.39 M/uL  Hemoglobin : 11.9 g/dL  Hematocrit : 38.0 %  Platelet Count - Automated : 198 K/uL  Mean Cell Volume : 86.6 fl  Mean Cell Hemoglobin : 27.1 pg  Mean Cell Hemoglobin Concentration : 31.3 gm/dL  Auto Neutrophil # : 5.93 K/uL  Auto Lymphocyte # : 2.48 K/uL  Auto Monocyte # : 1.17 K/uL  Auto Eosinophil # : 0.20 K/uL  Auto Basophil # : 0.02 K/uL  Auto Neutrophil % : 60.4 %  Auto Lymphocyte % : 25.3 %  Auto Monocyte % : 11.9 %  Auto Eosinophil % : 2.0 %  Auto Basophil % : 0.2 %        141  |  104  |  9.0  ----------------------------<  103  3.5   |  25.0  |  0.97    Ca    9.1      2019 02:53  Phos  2.9       Mg     2.3         TPro  7.5  /  Alb  4.6  /  TBili  0.3<L>  /  DBili  x   /  AST  35  /  ALT  43<H>  /  AlkPhos  76      PT/INR - ( 2019 00:19 )   PT: 12.2 sec;   INR: 1.06 ratio         PTT - ( 2019 00:19 )  PTT:29.4 sec  Urinalysis Basic - ( 2019 16:32 )    Color: Yellow / Appearance: Clear / S.020 / pH: x  Gluc: x / Ketone: Negative  / Bili: Negative / Urobili: Negative mg/dL   Blood: x / Protein: 15 mg/dL / Nitrite: Negative   Leuk Esterase: Negative / RBC: 0-5 /HPF / WBC 3-5   Sq Epi: x / Non Sq Epi: Few / Bacteria: Few

## 2019-07-27 NOTE — CHART NOTE - NSCHARTNOTEFT_GEN_A_CORE
PT received on 4L NC, HFNC on standby. PT SPO2 100%, titrated O2 to 2LPM NC, SPO2 100%. No distress noted. Continue to monitor.
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
## 2019-07-28 ENCOUNTER — TRANSCRIPTION ENCOUNTER (OUTPATIENT)
Age: 19
End: 2019-07-28

## 2019-07-28 VITALS
HEART RATE: 95 BPM | SYSTOLIC BLOOD PRESSURE: 140 MMHG | DIASTOLIC BLOOD PRESSURE: 82 MMHG | OXYGEN SATURATION: 97 % | RESPIRATION RATE: 18 BRPM | TEMPERATURE: 98 F

## 2019-07-28 PROCEDURE — 99238 HOSP IP/OBS DSCHRG MGMT 30/<: CPT

## 2019-07-28 RX ADMIN — LIDOCAINE 1 PATCH: 4 CREAM TOPICAL at 12:52

## 2019-07-28 RX ADMIN — ENOXAPARIN SODIUM 40 MILLIGRAM(S): 100 INJECTION SUBCUTANEOUS at 12:53

## 2019-07-28 NOTE — PROGRESS NOTE ADULT - ATTENDING COMMENTS
doing well clinically.  able to be discharged to home today. f/u in acs 2 weeks.
Seen and examined.      No dyspnea.  Pain improved.  NAD  AAOx4, non focal  non labored resp  Regular mild tachycardia    Improved hypoxic resp failure.  Weaned to 2L NC with good O2 sat.  ARDS improved to mild.   Tolerating diet.  Sinus tach - ECG with Sinus tach, otherwise normal.  KARLA resolved.  Mild HTN.  Pt and mother aware of need for f/u as outpt.  Will wean O2 as tolerated.  Ok for floor with .  Cont pulm hygiene.

## 2019-07-28 NOTE — PROGRESS NOTE ADULT - SUBJECTIVE AND OBJECTIVE BOX
HPI/OVERNIGHT EVENTS:    Pt examine at bed side, no acute events no complains.     MEDICATIONS  (STANDING):  enoxaparin Injectable 40 milliGRAM(s) SubCutaneous daily  lidocaine   Patch 1 Patch Transdermal daily    MEDICATIONS  (PRN):  acetaminophen   Tablet .. 650 milliGRAM(s) Oral every 6 hours PRN Mild Pain (1 - 3)      Vital Signs Last 24 Hrs  T(C): 36.6 (2019 15:57), Max: 36.9 (2019 01:00)  T(F): 97.8 (2019 15:57), Max: 98.4 (2019 01:00)  HR: 86 (2019 15:57) (74 - 107)  BP: 149/94 (2019 15:57) (129/63 - 154/90)  BP(mean): 100 (2019 13:19) (85 - 118)  RR: 20 (2019 15:57) (15 - 33)  SpO2: 98% (2019 15:57) (96% - 100%)    Constitutional: patient resting comfortably in bed, in no acute distress  HEENT: EOMI / PERRL b/l, no active drainage or redness  Neck: No JVD, full ROM without pain  Respiratory: respirations are unlabored, no accessory muscle use, no conversational dyspnea  Cardiovascular: regular rate & rhythm  Gastrointestinal: Abdomen soft, non-tender, non-distended, no rebound tenderness / guarding  Neurological: GCS: 15 (4/5/6). A&O x 3; no gross sensory / motor / coordination deficits  Psychiatric: Normal mood, normal affect  Musculoskeletal: No joint pain, swelling or deformity; no limitation of movement      I&O's Detail    2019 07:01  -  2019 07:00  --------------------------------------------------------  IN:    multiple electrolytes Injection Type 1multiple electrolytes Injection Type 1: 125 mL    Oral Fluid: 360 mL  Total IN: 485 mL    OUT:    Voided: 1350 mL  Total OUT: 1350 mL    Total NET: -865 mL      2019 07:01  -  2019 00:19  --------------------------------------------------------  IN:    Oral Fluid: 540 mL  Total IN: 540 mL    OUT:    Voided: 225 mL  Total OUT: 225 mL    Total NET: 315 mL          LABS:                        11.9   9.82  )-----------( 198      ( 2019 02:53 )             38.0     07-27    141  |  104  |  9.0  ----------------------------<  103  3.5   |  25.0  |  0.97    Ca    9.1      2019 02:53  Phos  2.9       Mg     2.3             Urinalysis Basic - ( 2019 16:32 )    Color: Yellow / Appearance: Clear / S.020 / pH: x  Gluc: x / Ketone: Negative  / Bili: Negative / Urobili: Negative mg/dL   Blood: x / Protein: 15 mg/dL / Nitrite: Negative   Leuk Esterase: Negative / RBC: 0-5 /HPF / WBC 3-5   Sq Epi: x / Non Sq Epi: Few / Bacteria: Few

## 2019-07-28 NOTE — DISCHARGE NOTE NURSING/CASE MANAGEMENT/SOCIAL WORK - NSDCDPATPORTLINK_GEN_ALL_CORE
You can access the [email or web address] Portal, offered by [facility], by registering with the following website: [email or web address]
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
## 2019-08-08 ENCOUNTER — APPOINTMENT (OUTPATIENT)
Dept: TRAUMA SURGERY | Facility: CLINIC | Age: 19
End: 2019-08-08
Payer: COMMERCIAL

## 2019-08-08 VITALS
HEART RATE: 72 BPM | HEIGHT: 72 IN | DIASTOLIC BLOOD PRESSURE: 83 MMHG | OXYGEN SATURATION: 97 % | WEIGHT: 296 LBS | BODY MASS INDEX: 40.09 KG/M2 | TEMPERATURE: 98 F | SYSTOLIC BLOOD PRESSURE: 146 MMHG

## 2019-08-08 DIAGNOSIS — Z82.5 FAMILY HISTORY OF ASTHMA AND OTHER CHRONIC LOWER RESPIRATORY DISEASES: ICD-10-CM

## 2019-08-08 DIAGNOSIS — Z83.2 FAMILY HISTORY OF DISEASES OF THE BLOOD AND BLOOD-FORMING ORGANS AND CERTAIN DISORDERS INVOLVING THE IMMUNE MECHANISM: ICD-10-CM

## 2019-08-08 DIAGNOSIS — Z78.9 OTHER SPECIFIED HEALTH STATUS: ICD-10-CM

## 2019-08-08 DIAGNOSIS — Z86.39 PERSONAL HISTORY OF OTHER ENDOCRINE, NUTRITIONAL AND METABOLIC DISEASE: ICD-10-CM

## 2019-08-08 DIAGNOSIS — Z83.49 FAMILY HISTORY OF OTHER ENDOCRINE, NUTRITIONAL AND METABOLIC DISEASES: ICD-10-CM

## 2019-08-08 DIAGNOSIS — Z83.3 FAMILY HISTORY OF DIABETES MELLITUS: ICD-10-CM

## 2019-08-08 DIAGNOSIS — Z82.49 FAMILY HISTORY OF ISCHEMIC HEART DISEASE AND OTHER DISEASES OF THE CIRCULATORY SYSTEM: ICD-10-CM

## 2019-08-08 PROCEDURE — 99212 OFFICE O/P EST SF 10 MIN: CPT
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
## 2019-08-12 ENCOUNTER — TRANSCRIPTION ENCOUNTER (OUTPATIENT)
Age: 19
End: 2019-08-12

## 2019-08-12 ENCOUNTER — NON-APPOINTMENT (OUTPATIENT)
Age: 19
End: 2019-08-12

## 2019-08-12 ENCOUNTER — APPOINTMENT (OUTPATIENT)
Dept: INTERNAL MEDICINE | Facility: CLINIC | Age: 19
End: 2019-08-12
Payer: COMMERCIAL

## 2019-08-12 VITALS
HEIGHT: 72 IN | DIASTOLIC BLOOD PRESSURE: 80 MMHG | BODY MASS INDEX: 38.74 KG/M2 | SYSTOLIC BLOOD PRESSURE: 120 MMHG | RESPIRATION RATE: 12 BRPM | HEART RATE: 75 BPM | WEIGHT: 286 LBS

## 2019-08-12 DIAGNOSIS — Z00.00 ENCOUNTER FOR GENERAL ADULT MEDICAL EXAMINATION W/OUT ABNORMAL FINDINGS: ICD-10-CM

## 2019-08-12 DIAGNOSIS — Z82.49 FAMILY HISTORY OF ISCHEMIC HEART DISEASE AND OTHER DISEASES OF THE CIRCULATORY SYSTEM: ICD-10-CM

## 2019-08-12 PROCEDURE — G0444 DEPRESSION SCREEN ANNUAL: CPT

## 2019-08-12 PROCEDURE — 99385 PREV VISIT NEW AGE 18-39: CPT | Mod: 25

## 2019-08-12 PROCEDURE — 36415 COLL VENOUS BLD VENIPUNCTURE: CPT

## 2019-08-12 PROCEDURE — 93000 ELECTROCARDIOGRAM COMPLETE: CPT

## 2019-08-12 PROCEDURE — G0442 ANNUAL ALCOHOL SCREEN 15 MIN: CPT

## 2019-08-12 RX ORDER — PNV NO.95/FERROUS FUM/FOLIC AC 28MG-0.8MG
TABLET ORAL
Refills: 0 | Status: ACTIVE | COMMUNITY

## 2019-08-12 NOTE — HEALTH RISK ASSESSMENT
[Very Good] : ~his/her~  mood as very good [No] : No [0] : 2) Feeling down, depressed, or hopeless: Not at all (0) [] : No [No falls in past year] : Patient reported no falls in the past year [de-identified] : basketball [[id number]] : 0 [With Family] : lives with family [Student] : student [Single] : single [Sexually Active] : sexually active [High Risk Behavior] : no high risk behavior [Reports changes in hearing] : Reports no changes in hearing [Reports changes in vision] : Reports no changes in vision [Reviewed no changes] : Reviewed no changes
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
## 2019-08-12 NOTE — PHYSICAL EXAM
[No Acute Distress] : no acute distress [Well Nourished] : well nourished [Well Developed] : well developed [Well-Appearing] : well-appearing [Normal Sclera/Conjunctiva] : normal sclera/conjunctiva [PERRL] : pupils equal round and reactive to light [EOMI] : extraocular movements intact [Normal Outer Ear/Nose] : the outer ears and nose were normal in appearance [Normal Oropharynx] : the oropharynx was normal [No JVD] : no jugular venous distention [No Lymphadenopathy] : no lymphadenopathy [Supple] : supple [Thyroid Normal, No Nodules] : the thyroid was normal and there were no nodules present [No Respiratory Distress] : no respiratory distress  [No Accessory Muscle Use] : no accessory muscle use [Clear to Auscultation] : lungs were clear to auscultation bilaterally [Normal Rate] : normal rate  [Regular Rhythm] : with a regular rhythm [Normal S1, S2] : normal S1 and S2 [No Murmur] : no murmur heard [No Carotid Bruits] : no carotid bruits [No Abdominal Bruit] : a ~M bruit was not heard ~T in the abdomen [No Varicosities] : no varicosities [Pedal Pulses Present] : the pedal pulses are present [No Edema] : there was no peripheral edema [No Palpable Aorta] : no palpable aorta [No Extremity Clubbing/Cyanosis] : no extremity clubbing/cyanosis [Soft] : abdomen soft [Non Tender] : non-tender [Non-distended] : non-distended [No Masses] : no abdominal mass palpated [No HSM] : no HSM [Normal Bowel Sounds] : normal bowel sounds [Normal Posterior Cervical Nodes] : no posterior cervical lymphadenopathy [Normal Anterior Cervical Nodes] : no anterior cervical lymphadenopathy [No Spinal Tenderness] : no spinal tenderness [No CVA Tenderness] : no CVA  tenderness [No Joint Swelling] : no joint swelling [Grossly Normal Strength/Tone] : grossly normal strength/tone [No Rash] : no rash [Coordination Grossly Intact] : coordination grossly intact [No Focal Deficits] : no focal deficits [Normal Gait] : normal gait [Deep Tendon Reflexes (DTR)] : deep tendon reflexes were 2+ and symmetric [Normal Affect] : the affect was normal [Normal Insight/Judgement] : insight and judgment were intact [de-identified] : obese
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
## 2019-08-13 DIAGNOSIS — D64.9 ANEMIA, UNSPECIFIED: ICD-10-CM

## 2019-08-13 LAB
ALBUMIN SERPL ELPH-MCNC: 4.8 G/DL
ALP BLD-CCNC: 68 U/L
ALT SERPL-CCNC: 42 U/L
ANION GAP SERPL CALC-SCNC: 14 MMOL/L
AST SERPL-CCNC: 20 U/L
BASOPHILS # BLD AUTO: 0.02 K/UL
BASOPHILS NFR BLD AUTO: 0.4 %
BILIRUB SERPL-MCNC: 0.6 MG/DL
BUN SERPL-MCNC: 10 MG/DL
CALCIUM SERPL-MCNC: 10.2 MG/DL
CHLORIDE SERPL-SCNC: 106 MMOL/L
CHOLEST SERPL-MCNC: 174 MG/DL
CHOLEST/HDLC SERPL: 4.4 RATIO
CO2 SERPL-SCNC: 25 MMOL/L
CREAT SERPL-MCNC: 1.02 MG/DL
EOSINOPHIL # BLD AUTO: 0.15 K/UL
EOSINOPHIL NFR BLD AUTO: 2.6 %
ESTIMATED AVERAGE GLUCOSE: 97 MG/DL
FERRITIN SERPL-MCNC: 258 NG/ML
FOLATE SERPL-MCNC: 10.8 NG/ML
GLUCOSE SERPL-MCNC: 86 MG/DL
HBA1C MFR BLD HPLC: 5 %
HCT VFR BLD CALC: 41.1 %
HDLC SERPL-MCNC: 40 MG/DL
HGB BLD-MCNC: 12.4 G/DL
IMM GRANULOCYTES NFR BLD AUTO: 0 %
IRON SATN MFR SERPL: 32 %
IRON SERPL-MCNC: 100 UG/DL
LDLC SERPL CALC-MCNC: 117 MG/DL
LYMPHOCYTES # BLD AUTO: 1.93 K/UL
LYMPHOCYTES NFR BLD AUTO: 34 %
MAN DIFF?: NORMAL
MCHC RBC-ENTMCNC: 26.6 PG
MCHC RBC-ENTMCNC: 30.2 GM/DL
MCV RBC AUTO: 88 FL
MONOCYTES # BLD AUTO: 0.54 K/UL
MONOCYTES NFR BLD AUTO: 9.5 %
NEUTROPHILS # BLD AUTO: 3.04 K/UL
NEUTROPHILS NFR BLD AUTO: 53.5 %
PLATELET # BLD AUTO: 236 K/UL
POTASSIUM SERPL-SCNC: 4.4 MMOL/L
PROT SERPL-MCNC: 7.3 G/DL
RBC # BLD: 4.67 M/UL
RBC # FLD: 12.6 %
SODIUM SERPL-SCNC: 145 MMOL/L
TIBC SERPL-MCNC: 309 UG/DL
TRANSFERRIN SERPL-MCNC: 263 MG/DL
TRIGL SERPL-MCNC: 83 MG/DL
UIBC SERPL-MCNC: 209 UG/DL
VIT B12 SERPL-MCNC: 488 PG/ML
WBC # FLD AUTO: 5.68 K/UL

## 2019-08-14 ENCOUNTER — TRANSCRIPTION ENCOUNTER (OUTPATIENT)
Age: 19
End: 2019-08-14

## 2019-08-15 ENCOUNTER — RESULT REVIEW (OUTPATIENT)
Age: 19
End: 2019-08-15

## 2020-02-03 ENCOUNTER — APPOINTMENT (OUTPATIENT)
Dept: INTERNAL MEDICINE | Facility: CLINIC | Age: 20
End: 2020-02-03

## 2020-03-14 ENCOUNTER — TRANSCRIPTION ENCOUNTER (OUTPATIENT)
Age: 20
End: 2020-03-14

## 2020-12-07 ENCOUNTER — TRANSCRIPTION ENCOUNTER (OUTPATIENT)
Age: 20
End: 2020-12-07

## 2021-02-01 ENCOUNTER — EMERGENCY (EMERGENCY)
Facility: HOSPITAL | Age: 21
LOS: 1 days | Discharge: HOME | End: 2021-02-01
Attending: EMERGENCY MEDICINE
Payer: COMMERCIAL

## 2021-02-01 VITALS
TEMPERATURE: 98 F | HEART RATE: 67 BPM | WEIGHT: 289.91 LBS | DIASTOLIC BLOOD PRESSURE: 84 MMHG | HEIGHT: 72 IN | RESPIRATION RATE: 18 BRPM | OXYGEN SATURATION: 99 % | SYSTOLIC BLOOD PRESSURE: 148 MMHG

## 2021-02-01 LAB — SARS-COV-2 RNA SPEC QL NAA+PROBE: SIGNIFICANT CHANGE UP

## 2021-02-01 PROCEDURE — 71046 X-RAY EXAM CHEST 2 VIEWS: CPT

## 2021-02-01 PROCEDURE — 99284 EMERGENCY DEPT VISIT MOD MDM: CPT

## 2021-02-01 PROCEDURE — 99053 MED SERV 10PM-8AM 24 HR FAC: CPT

## 2021-02-01 PROCEDURE — 71046 X-RAY EXAM CHEST 2 VIEWS: CPT | Mod: 26

## 2021-02-01 PROCEDURE — 73000 X-RAY EXAM OF COLLAR BONE: CPT

## 2021-02-01 PROCEDURE — 99284 EMERGENCY DEPT VISIT MOD MDM: CPT | Mod: 25

## 2021-02-01 PROCEDURE — U0003: CPT

## 2021-02-01 PROCEDURE — 73000 X-RAY EXAM OF COLLAR BONE: CPT | Mod: 26,RT

## 2021-02-01 PROCEDURE — U0005: CPT

## 2021-02-01 RX ORDER — IBUPROFEN 200 MG
600 TABLET ORAL ONCE
Refills: 0 | Status: COMPLETED | OUTPATIENT
Start: 2021-02-01 | End: 2021-02-01

## 2021-02-01 RX ADMIN — Medication 600 MILLIGRAM(S): at 07:32

## 2021-02-01 NOTE — ED ADULT NURSE NOTE - NSIMPLEMENTINTERV_GEN_ALL_ED
Implemented All Universal Safety Interventions:  [place] to call system. Call bell, personal items and telephone within reach. Instruct patient to call for assistance. Room bathroom lighting operational. Non-slip footwear when patient is off stretcher. Physically safe environment: no spills, clutter or unnecessary equipment. Stretcher in lowest position, wheels locked, appropriate side rails in place.

## 2021-02-01 NOTE — ED PROVIDER NOTE - PHYSICAL EXAMINATION
+ttp right clavicle; no deformity; no crepitus    ttp anterior aspect b/l ribs  cta b/l    abrasions to left knee  FROM  no bony tenderness    NV intact x 4 extremities
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
## 2021-02-01 NOTE — ED PROVIDER NOTE - PATIENT PORTAL LINK FT
You can access the FollowMyHealth Patient Portal offered by [facility] by registering at the following website: [email or web address]. By joining [employer]’s FollowMyHealth portal, you will also be able to view your health information using other applications (apps) compatible with our system.
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
## 2021-02-01 NOTE — ED PROVIDER NOTE - ATTENDING CONTRIBUTION TO CARE
I,Dylon Parikh MD,  performed the initial face to face bedside interview with this patient regarding history of present illness, review of symptoms and relevant past medical, social and family history.  I completed an independent physical examination.  I was the initial provider who evaluated this patient. I have signed out the follow up of any pending tests (i.e. labs, radiological studies) to the ACP.  I have communicated the patient’s plan of care and disposition with the ACP.
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
## 2023-10-06 NOTE — PATIENT PROFILE ADULT - FAMILY NEEDS
Spoke to patient and informed her that Dr. Cole sent her pain medication (Tramadol) to her pharmacy.     Patient verbalized understanding. All questions and concerns were addressed.   no

## 2024-03-28 NOTE — ED PROVIDER NOTE - DISCHARGE REVIEW MATERIAL PRESENTED
[facility] Note    Subjective:    The HPI and pertinent ROS is included in the Diagnostic Impression Remarks section at the end of the note. Please see below for further details. Chief complaint is at end of note.     Deshawn is a pleasant intelligent patient who is here for evaluation.     Modified Medications    No medications on file       Data reviewed 274}  Previous medical records reviewed and summarized in plan section at end of note.      If you are due for any health screening(s) below please notify me so we can arrange them to be ordered and scheduled. Most healthy patients at your age complete them, but you are free to accept or refuse. If you can't do it, I'll definitely understand. If you can, I'd certainly appreciate it!     Tests to Keep You Healthy    Colon Cancer Screening: ORDERED BUT NOT SCHEDULED  Last Blood Pressure <= 139/89 (3/28/2024): Yes      The following portions of the patient's history were reviewed and updated as appropriate: allergies, current medications, past family history, past medical history, past social history, past surgical history and problem list.    He  has a past medical history of Arthritis, Asthma, Cancer, Colon polyp, Elevated liver enzymes (11/24/2019), Fatty liver (11/24/2019), Hyperlipidemia, Hypertension, and Squamous cell carcinoma of skin.  He  has a past surgical history that includes Laminectomy; Vasectomy; right shoulder; Refractive surgery; Eye surgery; Colonoscopy (N/A, 8/16/2018); Esophagogastroduodenoscopy (N/A, 8/6/2020); and Colonoscopy (N/A, 8/6/2020).    He  reports that he has been smoking cigars and cigarettes. He has been exposed to tobacco smoke. He has never used smokeless tobacco. He reports current alcohol use. He reports that he does not use drugs.  He family history includes Diabetes in his maternal grandfather; Diabetes Mellitus in his maternal grandfather.    Review of patient's allergies indicates:   Allergen Reactions     "Cortisone Other (See Comments)     Insomnia         Tobacco Use: High Risk (3/28/2024)    Patient History     Smoking Tobacco Use: Every Day     Smokeless Tobacco Use: Never     Passive Exposure: Current     Physical Examination  General appearance: alert, cooperative, no distress  Neck: no thyromegaly, no neck stiffness  Lungs: clear to auscultation, no wheezes, rales or rhonchi, symmetric air entry  Heart: normal rate, regular rhythm, normal S1, S2, no murmurs, rubs, clicks or gallops  Abdomen: soft, nontender, nondistended, no rigidity, rebound, or guarding.   Back: no point tenderness over spine  Extremities: peripheral pulses normal, no unilateral leg swelling or calf tenderness   Neurological:alert, oriented, normal speech, no new focal findings or movement disorder noted from baseline    Neuro:               MS: alert, awake, oriented x3. Speech and thought process intact.  CN: PERRLA. Pupils constricted normally to light. Pupils constricted when looking at a near object, dilated when looking at a distant object, converged when my finger moved towards the nose.  Visual fields intact. EOMI. Sensation to light touch in all trigeminal divisions intact. Able to closed eyes tightly and smile without asymmetry. Hearing intact. No uvula deviation. 5/5 shoulder shrugs. Tongue able to protrude bilaterally without difficulty.   Motor: 5/5 all ext strength.  Sensation: to light touch intact throughout. Coordination: Intact finger-to-nose, walked with normal gait across room.           BP Readings from Last 3 Encounters:   03/28/24 126/68   02/22/24 126/72   10/26/23 (!) 143/74     Wt Readings from Last 3 Encounters:   03/28/24 (!) 137 kg (302 lb 0.5 oz)   10/26/23 (!) 141.1 kg (311 lb 1.1 oz)   03/23/23 (!) 139 kg (306 lb 7 oz)     /68 (BP Location: Right arm, Patient Position: Sitting, BP Method: Large (Manual))   Pulse 81   Temp 97.9 °F (36.6 °C) (Oral)   Resp 18   Ht 6' 5" (1.956 m)   Wt (!) 137 kg (302 lb " 0.5 oz)   SpO2 98%   BMI 35.82 kg/m²    274}  Laboratory: I have reviewed old labs below:    274}    Lab Results   Component Value Date    WBC 5.20 03/13/2023    HGB 13.5 (L) 03/13/2023    HCT 40.3 03/13/2023     (H) 03/13/2023     (L) 03/13/2023     03/13/2023    K 3.7 03/13/2023     03/13/2023    CALCIUM 9.2 03/13/2023    CO2 27 03/13/2023     03/13/2023    BUN 8 03/13/2023    CREATININE 0.9 03/13/2023    EGFRNORACEVR >60.0 03/13/2023    ANIONGAP 13 03/13/2023    PROT 7.6 05/23/2023    ALBUMIN 4.0 05/23/2023    BILITOT 1.0 05/23/2023    ALKPHOS 73 05/23/2023     (H) 05/23/2023    AST 72 (H) 05/23/2023    INR 1.0 11/19/2019    CHOL 138 03/13/2023    TRIG 72 03/13/2023    HDL 66 03/13/2023    LDLCALC 57.6 (L) 03/13/2023    TSH 3.895 05/23/2023    PSA 0.47 05/23/2023    HGBA1C 5.1 03/13/2023      Lab reviewed by me: Particular labs of significance that I will monitor, workup, or treat to improve are mentioned below in diagnostic impression remarks.    Imaging/EKG: I have reviewed the pertinent results and my findings are noted in remarks.  274}    CC:   Chief Complaint   Patient presents with    Annual Exam    Fall    Hyperlipidemia    Hypertension        274}    Assessment/Plan  Deshawn Rene is a 71 y.o. male who presents to clinic with:  1. Fall, sequela    2. Balance problem    3. Physical deconditioning    4. Macrocytic anemia    5. B12 deficiency    6. Abnormal finding of blood chemistry, unspecified    7. Severe obesity (BMI 35.0-39.9) with comorbidity    8. Thrombocytopenia, unspecified    9. Depression, recurrent    10. Aortic atherosclerosis    11. Abnormal results of liver function studies    12. Nicotine dependence, cigarettes, with unspecified nicotine-induced disorders    13. Chronic left shoulder pain    14. Colon cancer screening       274}  Diagnostic Impression Remarks + HPI     Documentation entered by me for this encounter may have been done in part  "using speech-recognition technology. Although I have made an effort to ensure accuracy, "sound like" errors may exist and should be interpreted in context.     Abnormal balance - present for over a year no head trauma has had falls suspect multifactorial was low on b12 start B12 all so he drinks about 3 beers per day along with some bourbon at night this could also be deficiency such as thiamine and also could be causing his balance issues recommend for him to avoid alcohol no focal neuro deficit   Anxiety-patient she was taking Wellbutrin not help much with this will stop it can wean off  Physical deconditioning rec work with pt     Macrocytic anemia suspect due to liver disease did have a low b12     Fatty liver disease also drinks around 3 beers daily rec to stop recommend Mediterranean diet weight loss    Severe obesity with comorbidity-Hypertension to which the severe obesity is a contributing factor. This is consistent with the definition of severe obesity with comorbidity. The patient's severe obesity was monitored, evaluated, addressed and/or treated. Diet and exercise recommended see counseling section for further info.   Depression-stable cont current meds monitor no SI/plan   Atherosclerosis of aorta- stable continue statin and rec healthy diet monitor follow lipid levels     Worse with movement does have some weakness suspect that he has a rotator cuff tear he does not want to see orthopedics for any further imaging or workup at this time no chest pain or shortness a breath  Nicotine dependence-needs improvement recommend smoking cessation offered medications patient wants to hold off on meds.   Assistance with smoking cessation was offered, including:  Recommend lung cancer screening he agreed will get CT scan of the lungs  [x]  Medications  [x]  Counseling  []  Printed Information on Smoking Cessation  []  Referral to a Smoking Cessation Program    Patient was counseled regarding smoking for >10 " minutes.    FIB-4 Calculation: 3.96 at 3/28/2024  8:58 AM     FIB-4 below 1.30 is considered as low-risk for advanced fibrosis  FIB-4 over 2.67 is considered as high-risk for advanced fibrosis  FIB-4 values between 1.30 and 2.67 are considered as intermediate-risk of advanced fibrosis for ages 36-64.     For ages > 64 the cut-off for low-risk goes to < 2.  This is a screening tool and clinical judgement should be used in the interpretation of these results.     Cancer screening recommend colon   This is the extent of this pleasant patient's concerns at this present time. He did not feel chest pain upon exertion, dyspnea, nausea, vomiting, diaphoresis, or syncope. No pleuritic chest pain, unilateral leg swelling, calf tenderness, or calf pain. Negative for unintentional weight loss night sweats, hematuria, and fevers.     Additional workup planned: see labs ordered below.    See below for labs and meds ordered with associated diagnosis      1. Fall, sequela    2. Balance problem    3. Physical deconditioning    4. Macrocytic anemia  - CBC Auto Differential; Future  - Comprehensive Metabolic Panel; Future  - Hemoglobin A1C; Future  - TSH; Future  - Lipid Panel; Future  - cyanocobalamin (VITAMIN B-12) 1000 MCG tablet; Take 1 tablet (1,000 mcg total) by mouth once daily.  Dispense: 90 tablet; Refill: 3    5. B12 deficiency  - cyanocobalamin (VITAMIN B-12) 1000 MCG tablet; Take 1 tablet (1,000 mcg total) by mouth once daily.  Dispense: 90 tablet; Refill: 3    6. Abnormal finding of blood chemistry, unspecified  - CBC Auto Differential; Future  - Comprehensive Metabolic Panel; Future  - Hemoglobin A1C; Future  - TSH; Future  - Lipid Panel; Future  - cyanocobalamin (VITAMIN B-12) 1000 MCG tablet; Take 1 tablet (1,000 mcg total) by mouth once daily.  Dispense: 90 tablet; Refill: 3  - VITAMIN B1; Future    7. Severe obesity (BMI 35.0-39.9) with comorbidity    8. Thrombocytopenia, unspecified  - Magnesium; Future  - Vitamin  B12 Deficiency Panel; Future  - Folate; Future    9. Depression, recurrent    10. Aortic atherosclerosis    11. Abnormal results of liver function studies  - HEPATITIS PANEL, ACUTE; Future  - COPPER, SERUM; Future  - Ambulatory referral/consult to Hepatology; Future  - PROTIME-INR; Future    12. Nicotine dependence, cigarettes, with unspecified nicotine-induced disorders    13. Chronic left shoulder pain    14. Colon cancer screening  - Case Request Endoscopy: COLONOSCOPY      Jayme Liz MD   274}    If you are due for any health screening(s) below please notify me so we can arrange them to be ordered and scheduled. Most healthy patients at your age complete them, but you are free to accept or refuse.     If you can't do it, I'll definitely understand. If you can, I'd certainly appreciate it!   Tests to Keep You Healthy    Colon Cancer Screening: ORDERED BUT NOT SCHEDULED  Last Blood Pressure <= 139/89 (3/28/2024): Yes           .